# Patient Record
Sex: FEMALE | Race: WHITE | NOT HISPANIC OR LATINO | Employment: FULL TIME | RURAL
[De-identification: names, ages, dates, MRNs, and addresses within clinical notes are randomized per-mention and may not be internally consistent; named-entity substitution may affect disease eponyms.]

---

## 2018-01-15 ENCOUNTER — HISTORICAL (OUTPATIENT)
Dept: ADMINISTRATIVE | Facility: HOSPITAL | Age: 48
End: 2018-01-15

## 2018-01-17 LAB
LAB AP GENERAL CAT - HISTORICAL: NORMAL
LAB AP INTERPRETATION/RESULT - HISTORICAL: NEGATIVE
LAB AP SPECIMEN ADEQUACY - HISTORICAL: NORMAL
LAB AP SPECIMEN SUBMITTED - HISTORICAL: NORMAL

## 2019-07-09 ENCOUNTER — HISTORICAL (OUTPATIENT)
Dept: ADMINISTRATIVE | Facility: HOSPITAL | Age: 49
End: 2019-07-09

## 2019-09-16 ENCOUNTER — HISTORICAL (OUTPATIENT)
Dept: ADMINISTRATIVE | Facility: HOSPITAL | Age: 49
End: 2019-09-16

## 2019-09-17 LAB
LAB AP CLINICAL INFORMATION: NORMAL
LAB AP DIAGNOSIS - HISTORICAL: NORMAL
LAB AP GROSS PATHOLOGY - HISTORICAL: NORMAL
LAB AP SPECIMEN SUBMITTED - HISTORICAL: NORMAL

## 2020-07-22 ENCOUNTER — HISTORICAL (OUTPATIENT)
Dept: ADMINISTRATIVE | Facility: HOSPITAL | Age: 50
End: 2020-07-22

## 2020-07-22 LAB — SARS-COV+SARS-COV-2 AG RESP QL IA.RAPID: POSITIVE

## 2021-03-23 RX ORDER — VORTIOXETINE 5 MG/1
5 TABLET, FILM COATED ORAL DAILY
COMMUNITY
End: 2021-06-14

## 2021-03-23 RX ORDER — MINERAL OIL
180 ENEMA (ML) RECTAL DAILY
COMMUNITY
End: 2021-06-14

## 2021-03-29 ENCOUNTER — OFFICE VISIT (OUTPATIENT)
Dept: OBSTETRICS AND GYNECOLOGY | Facility: CLINIC | Age: 51
End: 2021-03-29
Payer: COMMERCIAL

## 2021-03-29 ENCOUNTER — HISTORICAL (OUTPATIENT)
Dept: ADMINISTRATIVE | Facility: HOSPITAL | Age: 51
End: 2021-03-29

## 2021-03-29 VITALS
BODY MASS INDEX: 22.88 KG/M2 | WEIGHT: 129.13 LBS | DIASTOLIC BLOOD PRESSURE: 73 MMHG | SYSTOLIC BLOOD PRESSURE: 92 MMHG | HEIGHT: 63 IN

## 2021-03-29 DIAGNOSIS — Z12.4 SCREENING FOR MALIGNANT NEOPLASM OF THE CERVIX: ICD-10-CM

## 2021-03-29 DIAGNOSIS — Z01.419 ENCOUNTER FOR ANNUAL ROUTINE GYNECOLOGICAL EXAMINATION: Primary | ICD-10-CM

## 2021-03-29 PROCEDURE — 99213 OFFICE O/P EST LOW 20 MIN: CPT | Mod: PBBFAC | Performed by: OBSTETRICS & GYNECOLOGY

## 2021-03-29 PROCEDURE — 99999 PR PBB SHADOW E&M-EST. PATIENT-LVL III: ICD-10-PCS | Mod: PBBFAC,,, | Performed by: OBSTETRICS & GYNECOLOGY

## 2021-03-29 PROCEDURE — 99999 PR PBB SHADOW E&M-EST. PATIENT-LVL III: CPT | Mod: PBBFAC,,, | Performed by: OBSTETRICS & GYNECOLOGY

## 2021-03-29 PROCEDURE — 88142 CYTOPATH C/V THIN LAYER: CPT | Performed by: OBSTETRICS & GYNECOLOGY

## 2021-03-29 PROCEDURE — 99396 PREV VISIT EST AGE 40-64: CPT | Mod: S$PBB,,, | Performed by: OBSTETRICS & GYNECOLOGY

## 2021-03-29 PROCEDURE — 99396 PR PREVENTIVE VISIT,EST,40-64: ICD-10-PCS | Mod: S$PBB,,, | Performed by: OBSTETRICS & GYNECOLOGY

## 2021-03-30 LAB
LAB AP CLINICAL INFORMATION: NORMAL
LAB AP GENERAL CAT - HISTORICAL: NORMAL
LAB AP INTERPRETATION/RESULT - HISTORICAL: NEGATIVE
LAB AP SPECIMEN ADEQUACY - HISTORICAL: NORMAL
LAB AP SPECIMEN SUBMITTED - HISTORICAL: NORMAL

## 2021-04-01 LAB — INSULIN SERPL-ACNC: NORMAL U[IU]/ML

## 2021-04-21 ENCOUNTER — HOSPITAL ENCOUNTER (OUTPATIENT)
Dept: RADIOLOGY | Facility: HOSPITAL | Age: 51
Discharge: HOME | End: 2021-04-21
Payer: COMMERCIAL

## 2021-04-21 VITALS — HEIGHT: 63 IN | BODY MASS INDEX: 22.86 KG/M2 | WEIGHT: 129 LBS

## 2021-04-21 DIAGNOSIS — Z12.31 BREAST CANCER SCREENING BY MAMMOGRAM: ICD-10-CM

## 2021-04-21 PROCEDURE — 77067 SCR MAMMO BI INCL CAD: CPT | Mod: TC

## 2021-06-14 ENCOUNTER — OFFICE VISIT (OUTPATIENT)
Dept: FAMILY MEDICINE | Facility: CLINIC | Age: 51
End: 2021-06-14
Payer: COMMERCIAL

## 2021-06-14 VITALS
SYSTOLIC BLOOD PRESSURE: 120 MMHG | OXYGEN SATURATION: 98 % | RESPIRATION RATE: 20 BRPM | HEART RATE: 64 BPM | TEMPERATURE: 98 F | HEIGHT: 62 IN | BODY MASS INDEX: 23.37 KG/M2 | DIASTOLIC BLOOD PRESSURE: 74 MMHG | WEIGHT: 127 LBS

## 2021-06-14 DIAGNOSIS — R14.0 ABDOMINAL BLOATING: ICD-10-CM

## 2021-06-14 DIAGNOSIS — F32.89 POSTMENOPAUSAL DEPRESSION: ICD-10-CM

## 2021-06-14 DIAGNOSIS — F32.A DEPRESSION, UNSPECIFIED DEPRESSION TYPE: Primary | ICD-10-CM

## 2021-06-14 PROCEDURE — 99214 PR OFFICE/OUTPT VISIT, EST, LEVL IV, 30-39 MIN: ICD-10-PCS | Mod: ,,, | Performed by: FAMILY MEDICINE

## 2021-06-14 PROCEDURE — 99214 OFFICE O/P EST MOD 30 MIN: CPT | Mod: ,,, | Performed by: FAMILY MEDICINE

## 2021-06-14 RX ORDER — VORTIOXETINE 5 MG/1
5 TABLET, FILM COATED ORAL DAILY
Qty: 28 TABLET | Refills: 0
Start: 2021-06-14

## 2021-06-14 RX ORDER — TIZANIDINE 4 MG/1
TABLET ORAL
COMMUNITY
Start: 2021-06-08 | End: 2021-10-22 | Stop reason: SDUPTHER

## 2021-10-22 RX ORDER — TIZANIDINE 4 MG/1
4 TABLET ORAL EVERY 8 HOURS PRN
Qty: 30 TABLET | Refills: 0 | Status: SHIPPED | OUTPATIENT
Start: 2021-10-22 | End: 2021-11-21

## 2022-01-27 ENCOUNTER — LAB VISIT (OUTPATIENT)
Dept: PRIMARY CARE CLINIC | Facility: CLINIC | Age: 52
End: 2022-01-27

## 2022-01-27 DIAGNOSIS — Z20.822 COVID-19 RULED OUT: Primary | ICD-10-CM

## 2022-01-27 LAB
FLUAV AG UPPER RESP QL IA.RAPID: NEGATIVE
FLUBV AG UPPER RESP QL IA.RAPID: NEGATIVE
SARS-COV+SARS-COV-2 AG RESP QL IA.RAPID: NEGATIVE

## 2022-01-27 PROCEDURE — 87428 SARSCOV & INF VIR A&B AG IA: CPT | Performed by: NURSE PRACTITIONER

## 2022-01-27 NOTE — PROGRESS NOTES
Subjective:       Patient ID: Omayra Porras is a 51 y.o. female.    Chief Complaint: No chief complaint on file.    HPI  Review of Systems      Objective:      Physical Exam    Assessment:       Problem List Items Addressed This Visit    None     Visit Diagnoses     COVID-19 ruled out    -  Primary    Relevant Orders    SARS-CoV2 (COVID) with FLU Antigen          Plan:       Covid and flu only

## 2024-10-04 ENCOUNTER — CLINICAL SUPPORT (OUTPATIENT)
Dept: PRIMARY CARE CLINIC | Facility: CLINIC | Age: 54
End: 2024-10-04

## 2024-10-04 DIAGNOSIS — Z02.89 ENCOUNTER FOR PHYSICAL EXAMINATION RELATED TO EMPLOYMENT: Primary | ICD-10-CM

## 2024-10-04 NOTE — PROGRESS NOTES
Subjective     Patient ID: Omayra Porras is a 53 y.o. female.    Chief Complaint: No chief complaint on file.    HPI  Review of Systems       Objective     Physical Exam       Assessment and Plan     1. Encounter for physical examination related to employment        See scanned documents in .            No follow-ups on file.

## 2025-07-07 ENCOUNTER — RESULTS FOLLOW-UP (OUTPATIENT)
Dept: FAMILY MEDICINE | Facility: CLINIC | Age: 55
End: 2025-07-07

## 2025-07-07 ENCOUNTER — OFFICE VISIT (OUTPATIENT)
Dept: FAMILY MEDICINE | Facility: CLINIC | Age: 55
End: 2025-07-07
Payer: COMMERCIAL

## 2025-07-07 VITALS
WEIGHT: 135 LBS | OXYGEN SATURATION: 99 % | SYSTOLIC BLOOD PRESSURE: 110 MMHG | RESPIRATION RATE: 20 BRPM | HEART RATE: 77 BPM | DIASTOLIC BLOOD PRESSURE: 66 MMHG | BODY MASS INDEX: 23.92 KG/M2 | TEMPERATURE: 98 F | HEIGHT: 63 IN

## 2025-07-07 DIAGNOSIS — S99.911A INJURY OF RIGHT ANKLE, INITIAL ENCOUNTER: Primary | ICD-10-CM

## 2025-07-07 DIAGNOSIS — M79.672 LEFT FOOT PAIN: ICD-10-CM

## 2025-07-07 DIAGNOSIS — S92.025A CLOSED NONDISPLACED FRACTURE OF ANTERIOR PROCESS OF LEFT CALCANEUS, INITIAL ENCOUNTER: ICD-10-CM

## 2025-07-07 DIAGNOSIS — S82.64XA NONDISPLACED FRACTURE OF LATERAL MALLEOLUS OF RIGHT FIBULA, INITIAL ENCOUNTER FOR CLOSED FRACTURE: ICD-10-CM

## 2025-07-07 PROCEDURE — 1160F RVW MEDS BY RX/DR IN RCRD: CPT | Mod: ,,, | Performed by: PHYSICIAN ASSISTANT

## 2025-07-07 PROCEDURE — 99213 OFFICE O/P EST LOW 20 MIN: CPT | Mod: ,,, | Performed by: PHYSICIAN ASSISTANT

## 2025-07-07 PROCEDURE — 3074F SYST BP LT 130 MM HG: CPT | Mod: ,,, | Performed by: PHYSICIAN ASSISTANT

## 2025-07-07 PROCEDURE — 1159F MED LIST DOCD IN RCRD: CPT | Mod: ,,, | Performed by: PHYSICIAN ASSISTANT

## 2025-07-07 PROCEDURE — 3078F DIAST BP <80 MM HG: CPT | Mod: ,,, | Performed by: PHYSICIAN ASSISTANT

## 2025-07-07 PROCEDURE — 3008F BODY MASS INDEX DOCD: CPT | Mod: ,,, | Performed by: PHYSICIAN ASSISTANT

## 2025-07-07 NOTE — PROGRESS NOTES
Subjective:       Patient ID: Omayra Porras is a 54 y.o. female.    Chief Complaint: Foot Injury (POST 1 HOUR, reports falling off step, right ankle swollen and pain to left foot)    53 yo F presenting after ground level fall 1 hour prior to presentation.  States rolled R ankle externally when standing on a . Fell to L side, hit L elbow and thinks she fell on her L buttock but states it happened very quickly. States swelling and pain to R ankle immediately after injury. When in car she noticed that her L forefoot hurt more than her ankle despite not remembering injuring it in the fall.  Denies injury to head, denies LOC, confusion, headache.  Elbow and L buttock/hip non painful.    Foot Injury   Pertinent negatives include no numbness.     Review of Systems   Constitutional:  Negative for chills and fever.   Respiratory:  Negative for cough and shortness of breath.    Cardiovascular:  Negative for chest pain.   Neurological:  Negative for dizziness, vertigo, tremors, seizures, syncope, facial asymmetry, speech difficulty, light-headedness, numbness, headaches, coordination difficulties and memory loss.         Objective:      Physical Exam  Constitutional:       Appearance: Normal appearance.   Pulmonary:      Effort: Pulmonary effort is normal.   Musculoskeletal:      Right upper arm: Normal.      Left upper arm: Normal.      Right elbow: Normal.      Left elbow: Normal.      Right forearm: Normal.      Left forearm: Normal.      Right wrist: Normal.      Left wrist: Normal.      Cervical back: Normal.      Thoracic back: Normal.      Lumbar back: Normal.      Right hip: Normal.      Left hip: Normal.      Right knee: Normal.      Left knee: Normal.      Left lower leg: Normal.      Right foot: Normal capillary refill. Normal pulse.      Left foot: Normal capillary refill. Normal pulse.      Comments: R medial malleolus swollen and ttp. ROM painful  L foot: ttp diffusely overlying metatarsals. Pain with ROM and  dorsiflexion.   Skin:     General: Skin is warm and dry.      Capillary Refill: Capillary refill takes less than 2 seconds.   Neurological:      General: No focal deficit present.      Mental Status: She is alert.         No visits with results within 6 Month(s) from this visit.   Latest known visit with results is:   Lab Visit on 01/27/2022   Component Date Value Ref Range Status    Influenza A 01/27/2022 Negative  Negative, Invalid Final    Influenza B 01/27/2022 Negative  Negative, Invalid Final    COVID-19 Ag 01/27/2022 Negative  Negative, Invalid Final      Assessment:       1. Injury of right ankle, initial encounter    2. Left foot pain        Plan:   Injury of right ankle, initial encounter  -     X-Ray Ankle Complete 3 View Right; Future; Expected date: 07/07/2025    Left foot pain  -     X-Ray Foot Complete 3 view Left; Future; Expected date: 07/07/2025         Prelim XR read concerning for R distal fibular fx. Aircast applied. Advised RICE, non weight bearing.  L prelim without obvious fracture.    Radiology overread:  Significant for R distal fibular cortical avulsion  fx and effusion  L hairline anterior calcaneal fx  Attempted to contact patient via phone number on file, mobile/home phone answered by someone unrelated to patient- stated wrong number. Alternate phone number on file disconnected. Messaged patient via [employer] and instructed to call back. She will need L foot brace and outpatient urgent referral to ortho.    Risks, benefits, and side effects were discussed with the patient. All questions were answered to the fullest satisfaction of the patient, and pt verbalized understanding and agreement to treatment plan. Pt was to call with any new or worsening symptoms, or present to the ER

## 2025-07-07 NOTE — LETTER
July 7, 2025      [facility]  [address]  [place] [address]  Phone: [phone]  Fax: [phone]       Patient: Omayra Porras   YOB: 1970  Date of Visit: 07/07/2025    To Whom It May Concern:    Diane Porras  was at [facility] on 07/07/2025. The patient may return to work/school on 7/14/25 with no restrictions. If you have any questions or concerns, or if I can be of further assistance, please do not hesitate to contact me.    Sincerely,    Jennifer Hernandez PA-C

## 2025-07-09 ENCOUNTER — OFFICE VISIT (OUTPATIENT)
Dept: ORTHOPEDICS | Facility: CLINIC | Age: 55
End: 2025-07-09
Payer: COMMERCIAL

## 2025-07-09 DIAGNOSIS — S82.64XA NONDISPLACED FRACTURE OF LATERAL MALLEOLUS OF RIGHT FIBULA, INITIAL ENCOUNTER FOR CLOSED FRACTURE: ICD-10-CM

## 2025-07-09 DIAGNOSIS — S92.025A CLOSED NONDISPLACED FRACTURE OF ANTERIOR PROCESS OF LEFT CALCANEUS, INITIAL ENCOUNTER: ICD-10-CM

## 2025-07-09 PROCEDURE — 99999 PR PBB SHADOW E&M-EST. PATIENT-LVL III: CPT | Mod: PBBFAC,,, | Performed by: ORTHOPAEDIC SURGERY

## 2025-07-09 PROCEDURE — 99213 OFFICE O/P EST LOW 20 MIN: CPT | Mod: PBBFAC | Performed by: ORTHOPAEDIC SURGERY

## 2025-07-09 PROCEDURE — 27786 TREATMENT OF ANKLE FRACTURE: CPT | Mod: PBBFAC | Performed by: ORTHOPAEDIC SURGERY

## 2025-07-09 NOTE — PROGRESS NOTES
CLINIC NOTE       Chief Complaint   Patient presents with    Right Ankle - Injury, Pain        Omayra Porras is a 54 y.o. female seen today for evaluation of right ankle and left foot injuries.  She has reportedly tripped while walking on some pavers 2 days ago.  She sustained an inversion injury to her right ankle in a twisting injury to the left foot.  She had some pain involving the left forefoot region that has improved.  She had pain swelling involving the lateral malleolar region of the right ankle.  [facility] has [facility] where x-rays of the right ankle revealed a small avulsion fracture from the lateral malleolus.  I have reviewed the x-rays of the left foot and find no evidence of definite fracture dislocation or pathologic bone.      Past Medical History:   Diagnosis Date    GERD (gastroesophageal reflux disease)     Hiatal hernia     Migraines     Scoliosis     Tachycardia      Family History   Problem Relation Name Age of Onset    Diabetes Father      Heart disease Father      Breast cancer Mother      Hypertension Mother       Medications Ordered Prior to Encounter[1]    ROS     There were no vitals filed for this visit.    Past Surgical History:   Procedure Laterality Date    AUGMENTATION OF BREAST Bilateral 2002    CARPAL TUNNEL RELEASE Left     CERVICAL BIOPSY  W/ LOOP ELECTRODE EXCISION      SCOLIOSIS REPAIR      TONSILLECTOMY AND ADENOIDECTOMY          Review of patient's allergies indicates:   Allergen Reactions    Aciphex [rabeprazole] Hives    Sulfa (sulfonamide antibiotics) Swelling    Nexium [esomeprazole magnesium] Itching    Red dye Itching     Patient states it causes vaginal itching    Adhesive tape-silicones         Ortho Exam : Right ankle shows soft tissue swelling and tenderness palpation of the tip of the lateral malleolus.  Skin is intact.  Motor and sensory function intact right foot good cap refill to all toes.  Exam of the left ankle and foot show no evidence of  soft tissue swelling or ecchymosis.  Anterior drawer test normal.  He has minimal tenderness palpation along the distal forefoot region.    Radiographic Examination:    Technique:    Findings:    Impression:   See Above    Assessment and Plan  Patient Active Problem List    Diagnosis Date Noted    Depression 06/14/2021    Abdominal bloating 06/14/2021    Impression:  Fracture lateral malleolar tip-right ankle  Plan:  Cam walker boot issued.  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  Ice elevation and activity restrictions outlined.  Recheck 3 weeks.  X-ray or sooner for interval problems      Joseph Sauceda M.D.                   [1]   Current Outpatient Medications on File Prior to Visit   Medication Sig Dispense Refill    vortioxetine (TRINTELLIX) 5 mg Tab Take 1 tablet (5 mg total) by mouth once daily. (Patient not taking: Reported on 7/7/2025) 28 tablet 0     No current facility-administered medications on file prior to visit.

## 2025-07-23 ENCOUNTER — HOSPITAL ENCOUNTER (OUTPATIENT)
Dept: RADIOLOGY | Facility: HOSPITAL | Age: 55
Discharge: HOME | End: 2025-07-23
Attending: ORTHOPAEDIC SURGERY
Payer: COMMERCIAL

## 2025-07-23 ENCOUNTER — OFFICE VISIT (OUTPATIENT)
Dept: ORTHOPEDICS | Facility: CLINIC | Age: 55
End: 2025-07-23
Payer: COMMERCIAL

## 2025-07-23 VITALS
OXYGEN SATURATION: 99 % | BODY MASS INDEX: 23.91 KG/M2 | HEIGHT: 63 IN | WEIGHT: 134.94 LBS | SYSTOLIC BLOOD PRESSURE: 116 MMHG | HEART RATE: 65 BPM | DIASTOLIC BLOOD PRESSURE: 46 MMHG

## 2025-07-23 DIAGNOSIS — M25.551 RIGHT HIP PAIN: ICD-10-CM

## 2025-07-23 DIAGNOSIS — S82.64XA NONDISPLACED FRACTURE OF LATERAL MALLEOLUS OF RIGHT FIBULA, INITIAL ENCOUNTER FOR CLOSED FRACTURE: ICD-10-CM

## 2025-07-23 DIAGNOSIS — M25.572 LEFT ANKLE PAIN, UNSPECIFIED CHRONICITY: ICD-10-CM

## 2025-07-23 DIAGNOSIS — S82.64XA NONDISPLACED FRACTURE OF LATERAL MALLEOLUS OF RIGHT FIBULA, INITIAL ENCOUNTER FOR CLOSED FRACTURE: Primary | ICD-10-CM

## 2025-07-23 PROCEDURE — 73502 X-RAY EXAM HIP UNI 2-3 VIEWS: CPT | Mod: TC,RT

## 2025-07-23 PROCEDURE — 73610 X-RAY EXAM OF ANKLE: CPT | Mod: TC,RT

## 2025-07-23 PROCEDURE — 99999 PR PBB SHADOW E&M-EST. PATIENT-LVL IV: CPT | Mod: PBBFAC,,, | Performed by: ORTHOPAEDIC SURGERY

## 2025-07-23 PROCEDURE — 99214 OFFICE O/P EST MOD 30 MIN: CPT | Mod: PBBFAC,25 | Performed by: ORTHOPAEDIC SURGERY

## 2025-07-23 PROCEDURE — 73630 X-RAY EXAM OF FOOT: CPT | Mod: TC,LT

## 2025-07-23 RX ORDER — MELOXICAM 15 MG/1
15 TABLET ORAL DAILY PRN
Qty: 30 TABLET | Refills: 2 | Status: SHIPPED | OUTPATIENT
Start: 2025-07-23

## 2025-07-23 NOTE — PROGRESS NOTES
CLINIC NOTE       Chief Complaint   Patient presents with    Right Ankle - Injury, Pain    Left Ankle - Pain        Omayra Porras is a 54 y.o. female seen today for recheck of both ankles and requesting evaluation of right hip pain.  She sustained a small avulsion fracture from the tip of the lateral malleolus of the right ankle.  She had an inversion injury to the left ankle and foot without bony injury seen.  She has experiences right thigh pain at times and sense of weakness.  She has not previously had an x-ray of the right hip.    Past Medical History:   Diagnosis Date    GERD (gastroesophageal reflux disease)     Hiatal hernia     Migraines     Scoliosis     Tachycardia      Family History   Problem Relation Name Age of Onset    Diabetes Father      Heart disease Father      Breast cancer Mother      Hypertension Mother       Medications Ordered Prior to Encounter[1]    ROS     Vitals:    07/23/25 1116   BP: (!) 116/46   Pulse: 65       Past Surgical History:   Procedure Laterality Date    AUGMENTATION OF BREAST Bilateral 2002    CARPAL TUNNEL RELEASE Left     CERVICAL BIOPSY  W/ LOOP ELECTRODE EXCISION      SCOLIOSIS REPAIR      TONSILLECTOMY AND ADENOIDECTOMY          Review of patient's allergies indicates:   Allergen Reactions    Aciphex [rabeprazole] Hives    Sulfa (sulfonamide antibiotics) Swelling    Nexium [esomeprazole magnesium] Itching    Red dye Itching     Patient states it causes vaginal itching    Adhesive tape-silicones         Ortho Exam right hip is normal contour.  He has minimal tenderness palpation over the right GT bursal region.  No groin pain with hip flexion internal rotation.  Exam of the left foot and ankle have normal contour.  There is tenderness palpation of the base of the 5th metacarpal.  Exam of the right ankle shows some residual soft tissue swelling over the lateral malleolar region    Radiographic Examination:  Right ankle 07/23/2025    Technique:  Three views AP lateral  mortise    Findings:  Bones well mineralized.  Ankle mortise normally aligned.  That has small avulsion fracture of the tip of the lateral malleolus.    Impression:   See Above  Radiographic examination:  left foot that has 07/23/2025  Technique:  Three views AP lateral and oblique  Findings:  Bones well mineralized.  Joints are normally aligned.  That has no evidence of fracture, dislocation or pathologic bone.  Impression: See above  Radiographic examination:  Right hip that has 07/23/2025  Technique:  Three views AP and lateral projection right hip with AP pelvis  Findings:  Bones well mineralized.  Both hip joints are located.  That has no evidence of fracture, dislocation, pathologic bone or significant DJD.  Impression: See above  Assessment and Plan  Patient Active Problem List    Diagnosis Date Noted    Depression 06/14/2021    Abdominal bloating 06/14/2021    Impression:  1.  Healing small avulsion fracture tip of right ankle lateral malleolus 2.  Left foot sprain 3.  Low back pain/right GT bursitis  Plan:  Rx Mobic 15 mg 1 p.o. q.d. prn.  Recheck if symptoms persist or worsen        Joseph Sauceda M.D.                   [1]   Current Outpatient Medications on File Prior to Visit   Medication Sig Dispense Refill    vortioxetine (TRINTELLIX) 5 mg Tab Take 1 tablet (5 mg total) by mouth once daily. (Patient not taking: No sig reported) 28 tablet 0     No current facility-administered medications on file prior to visit.

## 2025-08-29 ENCOUNTER — OFFICE VISIT (OUTPATIENT)
Dept: FAMILY MEDICINE | Facility: CLINIC | Age: 55
End: 2025-08-29
Payer: COMMERCIAL

## 2025-08-29 DIAGNOSIS — J02.9 SORE THROAT: ICD-10-CM

## 2025-08-29 DIAGNOSIS — R53.83 FATIGUE, UNSPECIFIED TYPE: ICD-10-CM

## 2025-08-29 DIAGNOSIS — J00 NASOPHARYNGITIS: Primary | ICD-10-CM

## 2025-08-29 DIAGNOSIS — R05.9 COUGH, UNSPECIFIED TYPE: ICD-10-CM

## 2025-08-29 DIAGNOSIS — J34.89 NASAL DRAINAGE: ICD-10-CM

## 2025-08-29 LAB
CTP QC/QA: YES
MOLECULAR STREP A: NEGATIVE
POC MOLECULAR INFLUENZA A AGN: NEGATIVE
POC MOLECULAR INFLUENZA B AGN: NEGATIVE
SARS-COV-2 RDRP RESP QL NAA+PROBE: NEGATIVE

## 2025-08-29 PROCEDURE — 87502 INFLUENZA DNA AMP PROBE: CPT | Mod: QW,,, | Performed by: NURSE PRACTITIONER

## 2025-08-29 PROCEDURE — 99214 OFFICE O/P EST MOD 30 MIN: CPT | Mod: ,,, | Performed by: NURSE PRACTITIONER

## 2025-08-29 PROCEDURE — 87635 SARS-COV-2 COVID-19 AMP PRB: CPT | Mod: QW,,, | Performed by: NURSE PRACTITIONER

## 2025-08-29 PROCEDURE — 87651 STREP A DNA AMP PROBE: CPT | Mod: QW,,, | Performed by: NURSE PRACTITIONER

## 2025-08-29 PROCEDURE — 1160F RVW MEDS BY RX/DR IN RCRD: CPT | Mod: ,,, | Performed by: NURSE PRACTITIONER

## 2025-08-29 PROCEDURE — 1159F MED LIST DOCD IN RCRD: CPT | Mod: ,,, | Performed by: NURSE PRACTITIONER

## 2025-08-29 RX ORDER — PREDNISONE 10 MG/1
10 TABLET ORAL DAILY
Qty: 5 TABLET | Refills: 0 | Status: SHIPPED | OUTPATIENT
Start: 2025-08-29

## 2025-08-29 RX ORDER — CETIRIZINE HYDROCHLORIDE 10 MG/1
10 TABLET ORAL DAILY
Qty: 30 TABLET | Refills: 0 | Status: SHIPPED | OUTPATIENT
Start: 2025-08-29 | End: 2026-08-29